# Patient Record
(demographics unavailable — no encounter records)

---

## 2024-10-21 NOTE — BEGINNING OF VISIT
[0] : 2) Feeling down, depressed, or hopeless: Not at all (0) [PHQ-2 Negative] : PHQ-2 Negative [[id number]] : 0 [Pain Scale: ___] : On a scale of 1-10, today the patient's pain is a(n) [unfilled]. [Never] : Never [Date Discussed (MM/DD/YY): ___] : Discussed: [unfilled] [Reviewed, no changes] : Reviewed, no changes

## 2024-10-21 NOTE — BEGINNING OF VISIT
[0] : 2) Feeling down, depressed, or hopeless: Not at all (0) [PHQ-2 Negative] : PHQ-2 Negative [[id number]] : 0 [Pain Scale: ___] : On a scale of 1-10, today the patient's pain is a(n) [unfilled]. [Never] : Never [Date Discussed (MM/DD/YY): ___] : Discussed: [unfilled] [Reviewed, no changes] : Reviewed, no changes

## 2024-10-21 NOTE — BEGINNING OF VISIT
[0] : 2) Feeling down, depressed, or hopeless: Not at all (0) [PHQ-2 Negative] : PHQ-2 Negative [[id number]] : 0 [Pain Scale: ___] : On a scale of 1-10, today the patient's pain is a(n) [unfilled]. [Never] : Never [Date Discussed (MM/DD/YY): ___] : Discussed: [unfilled] [Reviewed, no changes] : Reviewed, no changes

## 2024-10-23 NOTE — PHYSICAL EXAM
[Obese] : obese [Normal] : normoactive bowel sounds, soft and nontender, no hepatosplenomegaly or masses appreciated [de-identified] : pleasant in NAD. [de-identified] : done by GYN   [de-identified] : small erythematous , scaly patchy lesions on back , thighs .

## 2024-10-23 NOTE — ASSESSMENT
[FreeTextEntry1] : 81 year old female with bilateral breast cancer , DRAKE  Asthma well controlled ,  Mild anemia , stable ( mild CKD )  Plan : return in 12 months or sooner for oncology related issues.             routine blood work per PCP

## 2024-10-23 NOTE — HISTORY OF PRESENT ILLNESS
[de-identified] : \par  74 year old female with mild normocytic anemia probably secondary to chronic kidney disease. work-up otherwise negative. last Hb :11, GFR around 50. , she continues on iron every other day. colonoscopy was negative few months ago. She denies any new complaints. \par  \par   [de-identified] : 05/17/2018 : patient referred for newly diagnosed breast cancer found on routine surveillance mammogram , She underwent needle localized lumpectomy with axillary sentinel node biopsy and  was found with invasive well differentiated ductal carcinoma measuring 2mm in addition to extensive DCIS intermediate grade involving the surgical margins in multiple foci . ER [place] strongly positive , HER 2 negative , no LVI , three sentinel nodes with no evidence of metastasis. final stage pT1a pN0 She is here to discuss further management . She denies any new complaints , no new bone pain , cough ,SOB .   12/11/2018 patient returns for follow up , she completed consolidative radiation to left breast , reports mild fatigue only . She started on arimidex and denies any adverse reaction , no arthralgias, hot flashes .   04/09/2019 : Patient returns for follow up , she continues on arimidex and complains only of patchy lesions on left nipple( with mild discharge) , back and thighs , partially resolved with fluocinonide , she had normal DEXA scan recently . She gives remote history of psoriasis. recent blood work shows stable renal function and Hb.  01/12/2021 Patient returns for left breast cancer s/p breast conserving surgery and radiation since May 2018 , she had negative mammogram in July 2020 and was seen by radiation for last visit . she feels well , denies significant joint pains or vasomotor symptoms ,she take calcium with vitamin D . she denies any new breast lumps or nipple discharge.  01/06 /2020 Patient returns for breast cancer on arimidex , tolerated well , also on iron for anemia.   6/30/2022 Patient returns for early stage breast cancer on adjuvant endocrine therapy since 2018 no evidence of disease, she denies arthralgias or vasomotor symptoms , she had negative colonoscopy recently .   1/5/2023 Patient returns for follow up , she continues on arimidex, she denies any new complaints , no bone pain , change in breathing , cough . headaches , hot flashes  , arthralgias. last blood work showed Hgb : 11.9 .   7/10/2023 Patient returns for follow up , she continues on arimidex. She denies any hot flashes , arthralgias, she had negative mammogram and stable DEXA scan .   1/9/2024 Patient returns for follo wup , she completed adjuvant therapy in 2023 .   10/2/2024 Patient returns for regularly scheduled visit , she had negative mammogram and pap smear . CT chest showed no new findings . Recent blood work with PCP shows mild anemia , stable .

## 2024-10-23 NOTE — PHYSICAL EXAM
[Obese] : obese [Normal] : normoactive bowel sounds, soft and nontender, no hepatosplenomegaly or masses appreciated [de-identified] : pleasant in NAD. [de-identified] : done by GYN   [de-identified] : small erythematous , scaly patchy lesions on back , thighs .

## 2024-10-23 NOTE — HISTORY OF PRESENT ILLNESS
[de-identified] : \par  74 year old female with mild normocytic anemia probably secondary to chronic kidney disease. work-up otherwise negative. last Hb :11, GFR around 50. , she continues on iron every other day. colonoscopy was negative few months ago. She denies any new complaints. \par  \par   [de-identified] : 05/17/2018 : patient referred for newly diagnosed breast cancer found on routine surveillance mammogram , She underwent needle localized lumpectomy with axillary sentinel node biopsy and  was found with invasive well differentiated ductal carcinoma measuring 2mm in addition to extensive DCIS intermediate grade involving the surgical margins in multiple foci . ER [place] strongly positive , HER 2 negative , no LVI , three sentinel nodes with no evidence of metastasis. final stage pT1a pN0 She is here to discuss further management . She denies any new complaints , no new bone pain , cough ,SOB .   12/11/2018 patient returns for follow up , she completed consolidative radiation to left breast , reports mild fatigue only . She started on arimidex and denies any adverse reaction , no arthralgias, hot flashes .   04/09/2019 : Patient returns for follow up , she continues on arimidex and complains only of patchy lesions on left nipple( with mild discharge) , back and thighs , partially resolved with fluocinonide , she had normal DEXA scan recently . She gives remote history of psoriasis. recent blood work shows stable renal function and Hb.  01/12/2021 Patient returns for left breast cancer s/p breast conserving surgery and radiation since May 2018 , she had negative mammogram in July 2020 and was seen by radiation for last visit . she feels well , denies significant joint pains or vasomotor symptoms ,she take calcium with vitamin D . she denies any new breast lumps or nipple discharge.  01/06 /2020 Patient returns for breast cancer on arimidex , tolerated well , also on iron for anemia.   6/30/2022 Patient returns for early stage breast cancer on adjuvant endocrine therapy since 2018 no evidence of disease, she denies arthralgias or vasomotor symptoms , she had negative colonoscopy recently .   1/5/2023 Patient returns for follow up , she continues on arimidex, she denies any new complaints , no bone pain , change in breathing , cough . headaches , hot flashes  , arthralgias. last blood work showed Hgb : 11.9 .   7/10/2023 Patient returns for follow up , she continues on arimidex. She denies any hot flashes , arthralgias, she had negative mammogram and stable DEXA scan .   1/9/2024 Patient returns for follo wup , she completed adjuvant therapy in 2023 .   10/2/2024 Patient returns for regularly scheduled visit , she had negative mammogram and pap smear . CT chest showed no new findings . Recent blood work with PCP shows mild anemia , stable .

## 2024-10-23 NOTE — HISTORY OF PRESENT ILLNESS
[de-identified] : \par  74 year old female with mild normocytic anemia probably secondary to chronic kidney disease. work-up otherwise negative. last Hb :11, GFR around 50. , she continues on iron every other day. colonoscopy was negative few months ago. She denies any new complaints. \par  \par   [de-identified] : 05/17/2018 : patient referred for newly diagnosed breast cancer found on routine surveillance mammogram , She underwent needle localized lumpectomy with axillary sentinel node biopsy and  was found with invasive well differentiated ductal carcinoma measuring 2mm in addition to extensive DCIS intermediate grade involving the surgical margins in multiple foci . ER [place] strongly positive , HER 2 negative , no LVI , three sentinel nodes with no evidence of metastasis. final stage pT1a pN0 She is here to discuss further management . She denies any new complaints , no new bone pain , cough ,SOB .   12/11/2018 patient returns for follow up , she completed consolidative radiation to left breast , reports mild fatigue only . She started on arimidex and denies any adverse reaction , no arthralgias, hot flashes .   04/09/2019 : Patient returns for follow up , she continues on arimidex and complains only of patchy lesions on left nipple( with mild discharge) , back and thighs , partially resolved with fluocinonide , she had normal DEXA scan recently . She gives remote history of psoriasis. recent blood work shows stable renal function and Hb.  01/12/2021 Patient returns for left breast cancer s/p breast conserving surgery and radiation since May 2018 , she had negative mammogram in July 2020 and was seen by radiation for last visit . she feels well , denies significant joint pains or vasomotor symptoms ,she take calcium with vitamin D . she denies any new breast lumps or nipple discharge.  01/06 /2020 Patient returns for breast cancer on arimidex , tolerated well , also on iron for anemia.   6/30/2022 Patient returns for early stage breast cancer on adjuvant endocrine therapy since 2018 no evidence of disease, she denies arthralgias or vasomotor symptoms , she had negative colonoscopy recently .   1/5/2023 Patient returns for follow up , she continues on arimidex, she denies any new complaints , no bone pain , change in breathing , cough . headaches , hot flashes  , arthralgias. last blood work showed Hgb : 11.9 .   7/10/2023 Patient returns for follow up , she continues on arimidex. She denies any hot flashes , arthralgias, she had negative mammogram and stable DEXA scan .   1/9/2024 Patient returns for follo wup , she completed adjuvant therapy in 2023 .   10/2/2024 Patient returns for regularly scheduled visit , she had negative mammogram and pap smear . CT chest showed no new findings . Recent blood work with PCP shows mild anemia , stable .

## 2024-10-23 NOTE — PHYSICAL EXAM
[Obese] : obese [Normal] : normoactive bowel sounds, soft and nontender, no hepatosplenomegaly or masses appreciated [de-identified] : pleasant in NAD. [de-identified] : done by GYN   [de-identified] : small erythematous , scaly patchy lesions on back , thighs .

## 2025-03-12 NOTE — DISCUSSION/SUMMARY
[FreeTextEntry1] : SOB ON EXERTION STABLE ASTHMA ON BREO WILL DESCALATE ETHAN/ WEIGHT LOSS HEMO/ CBC REVIEWED CHEST CT FUP

## 2025-03-28 NOTE — ASSESSMENT
[FreeTextEntry1] : 81 year old female with bilateral breast cancer , DRAKE  Asthma well controlled ,  Mild anemia , stable ( mild CKD )   S/P URTI , borderline  leukocytosis  CBC reviewed , wbc : 10.8  Hgb is stable ,   no increase in immature forms.  Old blood work shows wbc in high normal range. Plan : patient re-assured            repeat cbc with PCP in 3 - 6 months           follow up as scheduled

## 2025-03-28 NOTE — HISTORY OF PRESENT ILLNESS
[de-identified] : \par  74 year old female with mild normocytic anemia probably secondary to chronic kidney disease. work-up otherwise negative. last Hb :11, GFR around 50. , she continues on iron every other day. colonoscopy was negative few months ago. She denies any new complaints. \par  \par   [de-identified] : 05/17/2018 : patient referred for newly diagnosed breast cancer found on routine surveillance mammogram , She underwent needle localized lumpectomy with axillary sentinel node biopsy and  was found with invasive well differentiated ductal carcinoma measuring 2mm in addition to extensive DCIS intermediate grade involving the surgical margins in multiple foci . ER [place] strongly positive , HER 2 negative , no LVI , three sentinel nodes with no evidence of metastasis. final stage pT1a pN0 She is here to discuss further management . She denies any new complaints , no new bone pain , cough ,SOB .   12/11/2018 patient returns for follow up , she completed consolidative radiation to left breast , reports mild fatigue only . She started on arimidex and denies any adverse reaction , no arthralgias, hot flashes .   04/09/2019 : Patient returns for follow up , she continues on arimidex and complains only of patchy lesions on left nipple( with mild discharge) , back and thighs , partially resolved with fluocinonide , she had normal DEXA scan recently . She gives remote history of psoriasis. recent blood work shows stable renal function and Hb.  01/12/2021 Patient returns for left breast cancer s/p breast conserving surgery and radiation since May 2018 , she had negative mammogram in July 2020 and was seen by radiation for last visit . she feels well , denies significant joint pains or vasomotor symptoms ,she take calcium with vitamin D . she denies any new breast lumps or nipple discharge.  01/06 /2020 Patient returns for breast cancer on arimidex , tolerated well , also on iron for anemia.   6/30/2022 Patient returns for early stage breast cancer on adjuvant endocrine therapy since 2018 no evidence of disease, she denies arthralgias or vasomotor symptoms , she had negative colonoscopy recently .   1/5/2023 Patient returns for follow up , she continues on arimidex, she denies any new complaints , no bone pain , change in breathing , cough . headaches , hot flashes  , arthralgias. last blood work showed Hgb : 11.9 .   7/10/2023 Patient returns for follow up , she continues on arimidex. She denies any hot flashes , arthralgias, she had negative mammogram and stable DEXA scan .   1/9/2024 Patient returns for follo wup , she completed adjuvant therapy in 2023 .   10/2/2024 Patient returns for regularly scheduled visit , she had negative mammogram and pap smear . CT chest showed no new findings . Recent blood work with PCP shows mild anemia , stable .   3/28/2025 Patient returns for follow up , she was treated recently for mild URTI and noted with persistent leukocytosis , She denies any fever , SOB  . NO recent steroids.

## 2025-03-28 NOTE — PHYSICAL EXAM
[Obese] : obese [Normal] : clear to auscultation bilaterally, no dullness, no wheezing [de-identified] : pleasant in NAD. [de-identified] : small erythematous , scaly patchy lesions on back , thighs .

## 2025-04-22 NOTE — DISCUSSION/SUMMARY
[FreeTextEntry1] : 82 YEAR OLD FEMALE LMP ? PRESENTS FOR WELL WOMAN GYNECOLOGIC EVALUATION AND EXAMINATION. LAST SEEN FOR GYNECOLOGIC EXAMINATION AND PAP 4/1/2024; PPA ND HPV HR TESTING DONE AT THAT TIME WERE BOTH NEGATIVE. LAST WELL WOMAN VISIT WAS DONE 3/14/2022; NO NEW MEDICAL OR SURGICAL HISTORY SINCE LAST VISIT. MEDICATIONS; NO CHANGES MEDICATION ALLERGIES; NONE ROS;BMS-NORMAL; NO FAM HISTORY OF COLON CANCER; LAST COLONOSCOPY 5/2022          NO URINARY COMPLAINTS EXCEPT FOR NOCTURIA 2-3 X         NOT SEXUALLY ACTIVE BREASTS; NOT DONG BREAST SELF EXAMINATION AS PREVIOUSLY ADVISED                   LAST MAMMOGRPAHY WAS DONE 9/25/2024 [facility]                   NO FAM HISTORY OF BREAST CANCER DOES NOT EXERCISE; + MULTIVITAMINS; + CALCIUM SUPPLEMENT; ++ CHEESE; NO TOBACCO/V FRACTURE HISTORY; NONE NO FAM HSTORY OF OSTEOPOROSIS LAST BONE DENSITY TESTING DONE 4/21/2025 WAS NORMAL AT [facility]  PE;/72; TEMP 98./2 ; WEIGHT 230 LBS HEIGHT 5'3"       BREASTS; NO MASSES OR DISHARGES; SURGICAL SCAR LEFT       ABDOMEN;'SOFT, NO MASSES; NO TENDERNESS TO  PALPATION       PELVIC NORMAL EXTERNAL GENITALIA                     NORMAL URETHRAL MEATUS                     NORMAL LABIA MAJORA AND LABIA MINORA                     NORMAL VAGINA WITHOUT L;ESION                     NORMAL CERVIX                     ANTEVERTED NORMAL UTERUS ON BIMANUAL EXAMINATION                     NO PALPABLE ADNEXAL MASSES  IMP; WELL WOMAN          MENOPAUSE          PERSONAL HISTORY OF BREAST CANCER ( LEFT)          HYPERTENSION          ASTHMA  PLAN; NO PAP DONE AS PER GUIDELINES             MONTHLY BREAST SELF EXAMIANTION CONTINUED TO BE STRONGLY ADVISED             ANNUAL MAMMOGRPAHY ADVISED AND SCRIPT GIVEN TO BE TESTED 9/2025             RETURN TO OFFICE ONE YEAR FOR GYN EXAMINATION AND PAP OR RTO PRN